# Patient Record
Sex: MALE | Race: BLACK OR AFRICAN AMERICAN | Employment: UNEMPLOYED | URBAN - METROPOLITAN AREA
[De-identification: names, ages, dates, MRNs, and addresses within clinical notes are randomized per-mention and may not be internally consistent; named-entity substitution may affect disease eponyms.]

---

## 2018-10-05 ENCOUNTER — TELEPHONE (OUTPATIENT)
Dept: OPHTHALMOLOGY | Facility: CLINIC | Age: 38
End: 2018-10-05

## 2018-10-05 NOTE — TELEPHONE ENCOUNTER
LM stating that we would be unable to fill out any forms until we saw him.  I did state that if he has been seen by another Dr in the past 4 yrs he might want to try them.

## 2018-10-05 NOTE — TELEPHONE ENCOUNTER
[facility] Call Center    Phone Message    May a detailed message be left on voicemail: yes    Reason for Call: Other: Pt ha eye surgery with Dr Page back in 2014. Pt has a [employer] Safety [employer] Report that is is needing the Dr to fill out. Or do he need to schedule an appt first. Please contact pt at [phone].     Action Taken: Message routed to:  [facility]

## 2018-11-02 DIAGNOSIS — Z96.1 PSEUDOPHAKIA: Primary | ICD-10-CM
